# Patient Record
Sex: MALE | URBAN - METROPOLITAN AREA
[De-identification: names, ages, dates, MRNs, and addresses within clinical notes are randomized per-mention and may not be internally consistent; named-entity substitution may affect disease eponyms.]

---

## 2019-06-20 ENCOUNTER — RECORDS - HEALTHEAST (OUTPATIENT)
Dept: LAB | Facility: CLINIC | Age: 39
End: 2019-06-20

## 2019-06-20 LAB
ALBUMIN SERPL-MCNC: 4 G/DL (ref 3.5–5)
ALP SERPL-CCNC: 89 U/L (ref 45–120)
ALT SERPL W P-5'-P-CCNC: 81 U/L (ref 0–45)
ANION GAP SERPL CALCULATED.3IONS-SCNC: 12 MMOL/L (ref 5–18)
AST SERPL W P-5'-P-CCNC: 52 U/L (ref 0–40)
BILIRUB SERPL-MCNC: 0.6 MG/DL (ref 0–1)
BUN SERPL-MCNC: 15 MG/DL (ref 8–22)
CALCIUM SERPL-MCNC: 9.8 MG/DL (ref 8.5–10.5)
CHLORIDE BLD-SCNC: 103 MMOL/L (ref 98–107)
CHOLEST SERPL-MCNC: 258 MG/DL
CO2 SERPL-SCNC: 22 MMOL/L (ref 22–31)
CREAT SERPL-MCNC: 1.01 MG/DL (ref 0.7–1.3)
FASTING STATUS PATIENT QL REPORTED: ABNORMAL
GFR SERPL CREATININE-BSD FRML MDRD: >60 ML/MIN/1.73M2
GLUCOSE BLD-MCNC: 78 MG/DL (ref 70–125)
HDLC SERPL-MCNC: 53 MG/DL
LDLC SERPL CALC-MCNC: 161 MG/DL
POTASSIUM BLD-SCNC: 3.9 MMOL/L (ref 3.5–5)
PROT SERPL-MCNC: 7.4 G/DL (ref 6–8)
SODIUM SERPL-SCNC: 137 MMOL/L (ref 136–145)
TRIGL SERPL-MCNC: 219 MG/DL

## 2021-01-18 ENCOUNTER — E-VISIT (OUTPATIENT)
Dept: URGENT CARE | Facility: URGENT CARE | Age: 41
End: 2021-01-18
Payer: COMMERCIAL

## 2021-01-18 DIAGNOSIS — Z71.89 ADVICE GIVEN ABOUT 2019-NCOV INFECTION: Primary | ICD-10-CM

## 2021-01-18 PROCEDURE — 99421 OL DIG E/M SVC 5-10 MIN: CPT | Performed by: PHYSICIAN ASSISTANT

## 2021-01-18 NOTE — PATIENT INSTRUCTIONS
Dear Keon Smiley,    You have written you have tested positive for Covid 19 within the past 90 days. Per the CDC recommendation, we do not retest asymptomatic people within that 90 day period outside of exceptional circumstances.    You do not need to be retested before returning to work or school or other activities.    Your surgeon should not be retesting you before surgeries within the 90 day period of time.    We do retest people who have severe immune compromise. I do not see that you have severe immune compromise.  Why don't we retest: 10 days after symptom begin, people are no longer contagious (or after 20 days, if you have a weak immune system). The tests are frequently positive for up to 90 days long after people are at no risk of giving the virus to other people    After waiting the 10 or 20 days--as long as you're fever-free and feeling better--you may go back to work, school and other activities without having another test.  CDC Guidance can be viewed at: https://www.cdc.gov/coronavirus/2019-ncov/hcp/duration-isolation.html      January 18, 2021    RE:  Keonbrandon Smiley                                                                                                                                                       [address] [place] [id number]        To whom it may concern:    I evaluated Keon Smiley on 01/18/21.  He tested positive for covid on 12/30/2020.  He has quarantined for 10 days and is asymptomatic at this time. He can fly on 1/25/2021 and according to CDC regulations does not need to be retested to fly.  Retesting could generate a falsely positive test.        Sincerely,    Evans Cade [facility] PA-C

## 2021-02-24 ENCOUNTER — MYC MEDICAL ADVICE (OUTPATIENT)
Dept: PEDIATRICS | Facility: CLINIC | Age: 41
End: 2021-02-24

## 2021-02-24 ENCOUNTER — TELEPHONE (OUTPATIENT)
Dept: PEDIATRICS | Facility: CLINIC | Age: 41
End: 2021-02-24

## 2021-02-24 NOTE — TELEPHONE ENCOUNTER
The patient has an appointment scheduled with Kelsea Moreno for 2/25 to address his concerns for anxiety with flying .  The patient is not established with Kelsea and will need to establish care with her before she can prescribe any medications to help anxiety.  TC called and LVM for the patient to call the clinic back.  Kelsea is suggesting for the patient to establish care with her during the visit on 2/25 and then another virtual visit will need to be scheduled to address his concerns.  Otherwise, Kelsea suggests that he contact his PCP to address his concerns.     [employer] message also sent    Colette Burks

## 2021-02-24 NOTE — TELEPHONE ENCOUNTER
Kelsea- Please see MyCH[employer] message. [facility]? Health history, go over meds, stuff like that? You would have to address the other problem with a visit to follow? Medina Dawkins RN on 2/24/2021 at 1:37 PM

## 2021-02-25 ENCOUNTER — VIRTUAL VISIT (OUTPATIENT)
Dept: PEDIATRICS | Facility: CLINIC | Age: 41
End: 2021-02-25
Payer: COMMERCIAL

## 2021-02-25 DIAGNOSIS — Z76.89 ENCOUNTER TO ESTABLISH CARE: Primary | ICD-10-CM

## 2021-02-25 DIAGNOSIS — F41.9 ANXIETY: ICD-10-CM

## 2021-02-25 PROCEDURE — 99203 OFFICE O/P NEW LOW 30 MIN: CPT | Mod: 95 | Performed by: NURSE PRACTITIONER

## 2021-02-25 RX ORDER — LORAZEPAM 0.5 MG/1
TABLET ORAL
Qty: 10 TABLET | Refills: 0 | Status: SHIPPED | OUTPATIENT
Start: 2021-02-25

## 2021-02-25 SDOH — HEALTH STABILITY: MENTAL HEALTH: HOW OFTEN DO YOU HAVE A DRINK CONTAINING ALCOHOL?: NOT ASKED

## 2021-02-25 SDOH — HEALTH STABILITY: MENTAL HEALTH: HOW MANY STANDARD DRINKS CONTAINING ALCOHOL DO YOU HAVE ON A TYPICAL DAY?: NOT ASKED

## 2021-02-25 SDOH — HEALTH STABILITY: MENTAL HEALTH: HOW OFTEN DO YOU HAVE 6 OR MORE DRINKS ON ONE OCCASION?: NOT ASKED

## 2021-02-25 ASSESSMENT — ANXIETY QUESTIONNAIRES
6. BECOMING EASILY ANNOYED OR IRRITABLE: SEVERAL DAYS
5. BEING SO RESTLESS THAT IT IS HARD TO SIT STILL: NOT AT ALL
3. WORRYING TOO MUCH ABOUT DIFFERENT THINGS: SEVERAL DAYS
7. FEELING AFRAID AS IF SOMETHING AWFUL MIGHT HAPPEN: NOT AT ALL
1. FEELING NERVOUS, ANXIOUS, OR ON EDGE: SEVERAL DAYS
4. TROUBLE RELAXING: NOT AT ALL
GAD7 TOTAL SCORE: 4
2. NOT BEING ABLE TO STOP OR CONTROL WORRYING: SEVERAL DAYS
7. FEELING AFRAID AS IF SOMETHING AWFUL MIGHT HAPPEN: NOT AT ALL

## 2021-02-25 NOTE — PROGRESS NOTES
Keon is a 41 year old who is being evaluated via a billable video visit.      How would you like to obtain your AVS? MyChart  If the video visit is dropped, the invitation should be resent by: Send to e-mail at: [email or web address]  Will anyone else be joining your video visit? No      Video Start Time: 3:32 pm    Assessment & Plan     Encounter to establish care  Histories and mediations reviewed and updated.    Anxiety  Anxiety related to flying, particularly when boarding the plane and during take off. Described as mini panic attack. Interested in trying a medication as needed prior to flying. Will give limited prescription (#10 tabs) of ativan for use prior to flying only. Discussed risk for tolerance with this medication and advised against using while driving or taking while using alcohol.   Plan:   - LORazepam (ATIVAN) 0.5 MG tablet; Take 1 tablet (0.5 mg) 30-60 minutes before flying as needed for anxiety.    26 minutes spent on the date of the encounter doing patient visit and documentation          MEDICATIONS:        - Trial of ativan as needed prior to flying    FUTURE APPOINTMENTS:       - Follow-up for annual visit or as needed      DOMO Ventura CNP  [facility] [place]    John Herbert is a 41 year old who presents for the following health issues:    HPI     Patient here to establish care with new provider. No prior PCP.     Has a fear of flying, feels anxious whenever he flies. This anxiety has not improved despite flying regularly over the years. Has anxiety when boarding the plane and while taking off. Feels like a mini panic attack. Typically flies once every couple of months. Over the next 3 months he will be flying 3 times to [place], [place], and [place].    Has never treated his flight anxiety with mediation before. Interested in an a needed medication prior to flying.     Works as a  in [place].       Past Medical History:    Diagnosis Date     No pertinent past medical history      Past Surgical History:   Procedure Laterality Date     NO HISTORY OF SURGERY       Family History   Problem Relation Age of Onset     Breast Cancer Mother 74     Pacemaker Father      No Known Problems Sister      No Known Problems Maternal Grandmother      No Known Problems Maternal Grandfather      No Known Problems Paternal Grandmother      No Known Problems Sister      No Known Problems Paternal Grandfather      Social History     Socioeconomic History     Marital status: Not on file     Spouse name: Not on file     Number of children: Not on file     Years of education: Not on file     Highest education level: Not on file   Occupational History     Not on file   Social Needs     Financial resource strain: Not on file     Food insecurity     Worry: Not on file     Inability: Not on file     Transportation needs     Medical: Not on file     Non-medical: Not on file   Tobacco Use     Smoking status: Current Some Day Smoker     Packs/day: 0.00     Years: 5.00     Pack years: 0.00     Types: Cigarettes     Smokeless tobacco: Never Used     Tobacco comment: Smokes cigarettes socially, maybe once/week. <1 pack/week.   Substance and Sexual Activity     Alcohol use: Yes     Comment: Drinks couple nights/week, 5 drinks/week     Drug use: Never     Sexual activity: Yes     Partners: Female     Birth control/protection: Natural Family Planning   Lifestyle     Physical activity     Days per week: Not on file     Minutes per session: Not on file     Stress: Not on file   Relationships     Social connections     Talks on phone: Not on file     Gets together: Not on file     Attends Mosque service: Not on file     Active member of club or organization: Not on file     Attends meetings of clubs or organizations: Not on file     Relationship status: Not on file     Intimate partner violence     Fear of current or ex partner: Not on file     Emotionally abused: Not on  file     Physically abused: Not on file     Forced sexual activity: Not on file   Other Topics Concern     Not on file   Social History Narrative    . 18 year old daughter, 9 month old son.        Kelsea Moreno, DNP, APRN, CNP    2/25/2021     Current Outpatient Medications   Medication     LORazepam (ATIVAN) 0.5 MG tablet     No current facility-administered medications for this visit.       No Known Allergies      Review of Systems    ROS: 10 point ROS neg other than the symptoms noted above in the HPI.        Objective       Vitals:  No vitals were obtained today due to virtual visit.    Physical Exam   GENERAL: Healthy, alert and no distress  EYES: Eyes grossly normal to inspection.  No discharge or erythema, or obvious scleral/conjunctival abnormalities.  RESP: No audible wheeze, cough, or visible cyanosis.  No visible retractions or increased work of breathing.    SKIN: Visible skin clear. No significant rash, abnormal pigmentation or lesions.  NEURO: Cranial nerves grossly intact.  Mentation and speech appropriate for age.  PSYCH: Mentation appears normal, affect normal/bright, judgement and insight intact, normal speech and appearance well-groomed.            Video-Visit Details    Type of service:  Video Visit    Video End Time:3:45 PM    Originating Location (pt. Location): Home    Distant Location (provider location):  [facility] [place]     Platform used for Video Visit: [employer]

## 2021-02-25 NOTE — PATIENT INSTRUCTIONS
"It was nice to \"meet\" you today!    Plan:  - Prescribed limited supply ativan for use prior to flying only. Take 30-60 minutes prior to flying. Do not drive or use alcohol while taking this medication.   - Follow-up sometime this summer for physical exam.  "

## 2021-02-26 ASSESSMENT — ANXIETY QUESTIONNAIRES: GAD7 TOTAL SCORE: 4

## 2021-03-07 ENCOUNTER — HEALTH MAINTENANCE LETTER (OUTPATIENT)
Age: 41
End: 2021-03-07

## 2021-05-30 ENCOUNTER — RECORDS - HEALTHEAST (OUTPATIENT)
Dept: ADMINISTRATIVE | Facility: CLINIC | Age: 41
End: 2021-05-30

## 2021-10-11 ENCOUNTER — HEALTH MAINTENANCE LETTER (OUTPATIENT)
Age: 41
End: 2021-10-11

## 2022-03-27 ENCOUNTER — HEALTH MAINTENANCE LETTER (OUTPATIENT)
Age: 42
End: 2022-03-27

## 2022-09-25 ENCOUNTER — HEALTH MAINTENANCE LETTER (OUTPATIENT)
Age: 42
End: 2022-09-25

## 2023-05-08 ENCOUNTER — HEALTH MAINTENANCE LETTER (OUTPATIENT)
Age: 43
End: 2023-05-08